# Patient Record
Sex: MALE | Race: WHITE | NOT HISPANIC OR LATINO | Employment: FULL TIME | URBAN - METROPOLITAN AREA
[De-identification: names, ages, dates, MRNs, and addresses within clinical notes are randomized per-mention and may not be internally consistent; named-entity substitution may affect disease eponyms.]

---

## 2018-07-30 ENCOUNTER — TELEPHONE (OUTPATIENT)
Dept: UROLOGY | Facility: AMBULATORY SURGERY CENTER | Age: 48
End: 2018-07-30

## 2018-07-30 NOTE — TELEPHONE ENCOUNTER
Reason for appointment/Complaint/Diagnosis : FREQUENT URINATION     Insurance: [employer]    History of Cancer? no                       If yes, what kind? Previous urologist?     patient said he saw Dr Ramesh Blanton in the past           Records requested/where? No     Outside testing/where? no    Location Preference for office visit?  [address]

## 2019-04-04 RX ORDER — ACYCLOVIR 50 MG/G
CREAM TOPICAL 3 TIMES DAILY
COMMUNITY
Start: 2016-06-09 | End: 2019-04-09 | Stop reason: SDUPTHER

## 2019-04-04 RX ORDER — VALSARTAN 160 MG/1
160 TABLET ORAL 2 TIMES DAILY
COMMUNITY
Start: 2018-10-31

## 2019-04-04 RX ORDER — METOPROLOL SUCCINATE 50 MG/1
TABLET, EXTENDED RELEASE ORAL EVERY 12 HOURS
COMMUNITY
Start: 2017-06-12 | End: 2019-10-29

## 2019-04-04 RX ORDER — MECLIZINE HYDROCHLORIDE 25 MG/1
12.5 TABLET ORAL 3 TIMES DAILY PRN
COMMUNITY
Start: 2019-03-21 | End: 2019-10-29

## 2019-04-04 RX ORDER — AMLODIPINE BESYLATE 5 MG/1
5 TABLET ORAL
COMMUNITY
Start: 2016-05-25

## 2019-04-04 RX ORDER — ATORVASTATIN CALCIUM 80 MG/1
80 TABLET, FILM COATED ORAL
COMMUNITY
Start: 2016-06-09 | End: 2019-10-29

## 2019-04-04 RX ORDER — METOPROLOL TARTRATE 50 MG/1
50 TABLET, FILM COATED ORAL 2 TIMES DAILY
COMMUNITY
Start: 2018-10-09

## 2019-04-04 RX ORDER — ASPIRIN 81 MG/1
81 TABLET, CHEWABLE ORAL
COMMUNITY
Start: 2015-12-29

## 2019-04-04 RX ORDER — VALSARTAN 320 MG/1
TABLET ORAL
COMMUNITY
Start: 2016-02-08 | End: 2019-10-29

## 2019-04-09 ENCOUNTER — OFFICE VISIT (OUTPATIENT)
Dept: FAMILY MEDICINE CLINIC | Facility: CLINIC | Age: 49
End: 2019-04-09
Payer: COMMERCIAL

## 2019-04-09 VITALS
OXYGEN SATURATION: 97 % | SYSTOLIC BLOOD PRESSURE: 132 MMHG | DIASTOLIC BLOOD PRESSURE: 90 MMHG | HEIGHT: 73 IN | WEIGHT: 247.4 LBS | RESPIRATION RATE: 16 BRPM | TEMPERATURE: 97.8 F | HEART RATE: 72 BPM | BODY MASS INDEX: 32.79 KG/M2

## 2019-04-09 DIAGNOSIS — B00.1 COLD SORE: ICD-10-CM

## 2019-04-09 DIAGNOSIS — I10 ESSENTIAL HYPERTENSION: ICD-10-CM

## 2019-04-09 DIAGNOSIS — R93.89 ABNORMAL FINDING ON CT SCAN: Primary | ICD-10-CM

## 2019-04-09 DIAGNOSIS — E11.69 TYPE 2 DIABETES MELLITUS WITH OTHER SPECIFIED COMPLICATION, WITHOUT LONG-TERM CURRENT USE OF INSULIN (HCC): ICD-10-CM

## 2019-04-09 PROBLEM — I49.3 VENTRICULAR ECTOPY: Status: ACTIVE | Noted: 2017-10-18

## 2019-04-09 PROBLEM — E11.9 DIABETES MELLITUS (HCC): Status: ACTIVE | Noted: 2019-04-09

## 2019-04-09 PROBLEM — R07.9 CHEST PAIN: Status: ACTIVE | Noted: 2017-06-08

## 2019-04-09 PROCEDURE — 99214 OFFICE O/P EST MOD 30 MIN: CPT | Performed by: FAMILY MEDICINE

## 2019-04-09 PROCEDURE — 3008F BODY MASS INDEX DOCD: CPT | Performed by: FAMILY MEDICINE

## 2019-04-09 RX ORDER — SODIUM FLUORIDE 5 MG/ML
PASTE, DENTIFRICE DENTAL
COMMUNITY
Start: 2019-03-06

## 2019-04-09 RX ORDER — DOXYCYCLINE HYCLATE 20 MG
20 TABLET ORAL 2 TIMES DAILY
Refills: 3 | COMMUNITY
Start: 2019-02-13

## 2019-04-09 RX ORDER — ACYCLOVIR 50 MG/G
CREAM TOPICAL 3 TIMES DAILY
Qty: 15 G | Refills: 1 | Status: SHIPPED | OUTPATIENT
Start: 2019-04-09

## 2019-04-09 RX ORDER — CHLORHEXIDINE GLUCONATE 0.12 MG/ML
RINSE ORAL
COMMUNITY
Start: 2019-02-15

## 2019-04-09 RX ORDER — BLOOD SUGAR DIAGNOSTIC
STRIP MISCELLANEOUS
COMMUNITY
Start: 2019-01-30

## 2019-04-10 ENCOUNTER — TELEPHONE (OUTPATIENT)
Dept: FAMILY MEDICINE CLINIC | Facility: CLINIC | Age: 49
End: 2019-04-10

## 2019-05-13 ENCOUNTER — TELEPHONE (OUTPATIENT)
Dept: FAMILY MEDICINE CLINIC | Facility: CLINIC | Age: 49
End: 2019-05-13

## 2019-05-13 PROBLEM — R06.00 DYSPNEA: Status: ACTIVE | Noted: 2017-06-08

## 2019-08-18 DIAGNOSIS — E11.69 TYPE 2 DIABETES MELLITUS WITH OTHER SPECIFIED COMPLICATION, WITHOUT LONG-TERM CURRENT USE OF INSULIN (HCC): Primary | ICD-10-CM

## 2019-09-23 ENCOUNTER — TELEPHONE (OUTPATIENT)
Dept: FAMILY MEDICINE CLINIC | Facility: CLINIC | Age: 49
End: 2019-09-23

## 2019-10-29 ENCOUNTER — OFFICE VISIT (OUTPATIENT)
Dept: FAMILY MEDICINE CLINIC | Facility: CLINIC | Age: 49
End: 2019-10-29
Payer: COMMERCIAL

## 2019-10-29 VITALS
TEMPERATURE: 98.7 F | RESPIRATION RATE: 16 BRPM | HEIGHT: 73 IN | OXYGEN SATURATION: 95 % | BODY MASS INDEX: 32.1 KG/M2 | SYSTOLIC BLOOD PRESSURE: 138 MMHG | DIASTOLIC BLOOD PRESSURE: 80 MMHG | HEART RATE: 94 BPM | WEIGHT: 242.2 LBS

## 2019-10-29 DIAGNOSIS — R63.4 RECENT UNEXPLAINED WEIGHT LOSS: ICD-10-CM

## 2019-10-29 DIAGNOSIS — E11.69 TYPE 2 DIABETES MELLITUS WITH OTHER SPECIFIED COMPLICATION, WITHOUT LONG-TERM CURRENT USE OF INSULIN (HCC): Primary | ICD-10-CM

## 2019-10-29 DIAGNOSIS — R25.3 MUSCLE TWITCHING: ICD-10-CM

## 2019-10-29 PROCEDURE — 99214 OFFICE O/P EST MOD 30 MIN: CPT | Performed by: PHYSICIAN ASSISTANT

## 2019-10-29 PROCEDURE — 3008F BODY MASS INDEX DOCD: CPT | Performed by: PHYSICIAN ASSISTANT

## 2019-10-29 RX ORDER — ATORVASTATIN CALCIUM 40 MG/1
20 TABLET, FILM COATED ORAL DAILY
COMMUNITY

## 2019-10-29 NOTE — PROGRESS NOTES
Diabetic Foot Exam    Patient's shoes and socks removed  Right Foot/Ankle   Right Foot Inspection  Skin Exam: skin normal and skin intact no dry skin, no warmth, no callus, no erythema, no maceration, no abnormal color, no pre-ulcer, no ulcer and no callus                          Toe Exam: ROM and strength within normal limits  Sensory       Monofilament testing: intact  Vascular    The right DP pulse is 2+  The right PT pulse is 2+  Left Foot/Ankle  Left Foot Inspection  Skin Exam: skin normal and skin intactno dry skin, no warmth, no erythema, no maceration, normal color, no pre-ulcer, no ulcer and no callus                         Toe Exam: ROM and strength within normal limits                   Sensory       Monofilament: intact  Vascular    The left DP pulse is 2+  The left PT pulse is 2+  Assign Risk Category:  No deformity present; No loss of protective sensation; No weak pulses       Risk: 0    Assessment/Plan:    -continue follow-up with the endocrinologist for diabetes  Current hemoglobin A1c done on August 12th is 7 7  -proceed with a TSH level since there is no level over the past year  -refer to Neurology if twitches persist  -form given for a diabetic eye exam   Patient states that his wife follows with a doctor which is where he will go to  -advised to check his feet daily  He does have a macerated area between his 4th and 5th toes  Follow-up with any open sores   -routine follow-up in 5 months    M*Modal software was used to dictate this note  It may contain errors with dictating incorrect words/spelling  Please contact provider directly for any questions  Diagnoses and all orders for this visit:    Type 2 diabetes mellitus with other specified complication, without long-term current use of insulin (HCC)  -     TSH, 3rd generation with Free T4 reflex    Muscle twitching  -     TSH, 3rd generation with Free T4 reflex  -     Ambulatory referral to Neurology;  Future    Recent unexplained weight loss  -     TSH, 3rd generation with Free T4 reflex    Other orders  -     atorvastatin (LIPITOR) 40 mg tablet; Take 40 mg by mouth daily          Subjective:      Patient ID: Tivis Aschoff  is a 54 y o  male  Patient presents today for evaluation of intermittent twitches she has had all over his body including upper extremities, back, lower extremities over the past 9 days  He states that started when he was traveling on a plane to [facility] and then he went to [place]  He was seen by an urgent care and had a blood test to check his electrolytes which she states were normal except for his elevated blood sugar  He does follow with [facility] and he states that the doctor is in the midst of changing his medications because his most recent hemoglobin A1c done in August 12th was 7 7  He returned back from his trip today  He was concerned about his continued symptoms  He states he does keep himself well hydrated  He states earlier today was having some twitches in his low back but none at this time  He denies any recent illnesses  He denies any fevers or chills  He states he has noticed a 5 lb weight loss  He does have intermittent palpitations but he relates that to his ongoing cardiac problem which she does follow with Cardiology  He denies any fever, chills, nausea, vomiting, abdominal pain, throat pain, congestion, cough  The following portions of the patient's history were reviewed and updated as appropriate:   He  has a past medical history of Varicose veins of left lower extremity    He   Patient Active Problem List    Diagnosis Date Noted    Muscle twitching 10/29/2019    Recent unexplained weight loss 10/29/2019    Diabetes mellitus ([facility] [address] ) 04/09/2019    Abnormal finding on CT scan 04/09/2019    Cold sore 04/09/2019    Ventricular ectopy 10/18/2017    Chest pain 06/08/2017    Dyspnea 06/08/2017    Palpitations 12/28/2016    Lightheadedness 06/10/2016    Diabetes type 2, uncontrolled ([facility] [address] ) 03/03/2016    Neuropathy 03/03/2016    CAD (coronary artery disease) 01/20/2016    Lipid disorder 01/14/2016    Tobacco use disorder 06/27/2014    Near syncope 03/27/2008    Other chronic nonalcoholic liver disease 95/87/7592     He  has a past surgical history that includes Appendectomy  His family history includes Coronary artery disease in his father; Hypertension in his father and mother  He  reports that he quit smoking about 4 years ago  His smoking use included cigarettes  He has never used smokeless tobacco  His alcohol and drug histories are not on file  Current Outpatient Medications   Medication Sig Dispense Refill    atorvastatin (LIPITOR) 40 mg tablet Take 40 mg by mouth daily      ACCU-CHEK GUIDE test strip       acyclovir (ZOVIRAX) 5 % cream Apply topically 3 (three) times a day 15 g 1    amLODIPine (NORVASC) 5 mg tablet Take 5 mg by mouth      aspirin 81 mg chewable tablet Chew 81 mg      chlorhexidine (PERIDEX) 0 12 % solution       doxycycline (PERIOSTAT) 20 MG tablet Take 20 mg by mouth 2 (two) times a day  3    metoprolol tartrate (LOPRESSOR) 50 mg tablet Take 50 mg by mouth      PREVIDENT 5000 PLUS 1 1 % CREA       sitaGLIPtin-metFORMIN (JANUMET)  MG per tablet Take 1 tablet by mouth      ticagrelor 60 MG Take 60 mg by mouth      valsartan (DIOVAN) 160 mg tablet Take 160 mg by mouth       No current facility-administered medications for this visit        Current Outpatient Medications on File Prior to Visit   Medication Sig    atorvastatin (LIPITOR) 40 mg tablet Take 40 mg by mouth daily    ACCU-CHEK GUIDE test strip     acyclovir (ZOVIRAX) 5 % cream Apply topically 3 (three) times a day    amLODIPine (NORVASC) 5 mg tablet Take 5 mg by mouth    aspirin 81 mg chewable tablet Chew 81 mg    chlorhexidine (PERIDEX) 0 12 % solution     doxycycline (PERIOSTAT) 20 MG tablet Take 20 mg by mouth 2 (two) times a day    metoprolol tartrate (LOPRESSOR) 50 mg tablet Take 50 mg by mouth    PREVIDENT 5000 PLUS 1 1 % CREA     sitaGLIPtin-metFORMIN (JANUMET)  MG per tablet Take 1 tablet by mouth    ticagrelor 60 MG Take 60 mg by mouth    valsartan (DIOVAN) 160 mg tablet Take 160 mg by mouth    [DISCONTINUED] atorvastatin (LIPITOR) 80 mg tablet Take 80 mg by mouth    [DISCONTINUED] Doxycycline Hyclate 120 MG TBEC Take by mouth    [DISCONTINUED] meclizine (ANTIVERT) 25 mg tablet Take 12 5 mg by mouth Three times daily as needed    [DISCONTINUED] metoprolol succinate (TOPROL-XL) 50 mg 24 hr tablet Every 12 hours    [DISCONTINUED] ticagrelor (BRILINTA) 90 MG Every 12 hours    [DISCONTINUED] valsartan (DIOVAN) 320 MG tablet TAKE 1 TABLET BY MOUTH EVERY DAY     No current facility-administered medications on file prior to visit  He is allergic to clopidogrel; levofloxacin; lisinopril; mirtazapine; penicillins; sertraline; and sulfa antibiotics       Review of Systems   Constitutional:        As stated in HPI   Respiratory: Negative for shortness of breath  Cardiovascular: Positive for palpitations  Negative for chest pain and leg swelling  Gastrointestinal:        As stated in HPI   Musculoskeletal:        As stated in HPI         Objective:      /80 (BP Location: Left arm, Patient Position: Sitting, Cuff Size: Large)   Pulse 94   Temp 98 7 °F (37 1 °C) (Tympanic)   Resp 16   Ht 6' 1" (1 854 m)   Wt 110 kg (242 lb 3 2 oz)   SpO2 95%   BMI 31 95 kg/m²          Physical Exam   Constitutional: He appears well-developed and well-nourished  No distress  HENT:   Head: Normocephalic and atraumatic  Right Ear: External ear normal    Left Ear: External ear normal    Mouth/Throat: Oropharynx is clear and moist  No oropharyngeal exudate  Neck: Neck supple  No thyromegaly present  Cardiovascular: Normal rate, regular rhythm and normal heart sounds  Pulses are no weak pulses  No murmur heard    Pulses: Dorsalis pedis pulses are 2+ on the right side, and 2+ on the left side  Posterior tibial pulses are 2+ on the right side, and 2+ on the left side  Pulmonary/Chest: Effort normal and breath sounds normal  No respiratory distress  He has no wheezes  He has no rales  Musculoskeletal: He exhibits no edema  Feet:    Lumbar spine:  No tenderness, full range of motion, negative straight leg raise bilaterally  No obvious atrophy of the lower extremities  He currently has shorts on  Feet:   Right Foot:   Skin Integrity: Negative for ulcer, skin breakdown, erythema, warmth, callus or dry skin  Left Foot:   Skin Integrity: Negative for ulcer, skin breakdown, erythema, warmth, callus or dry skin  Lymphadenopathy:     He has no cervical adenopathy  Neurological: He is alert  Skin: Skin is warm  Psychiatric: He has a normal mood and affect

## 2019-10-30 ENCOUNTER — TELEPHONE (OUTPATIENT)
Dept: FAMILY MEDICINE CLINIC | Facility: CLINIC | Age: 49
End: 2019-10-30

## 2019-10-30 NOTE — TELEPHONE ENCOUNTER
Patient saw you yesterday and was referred to neurology    He cannot get in until January so is asking for any other options other than the ER

## 2019-12-03 ENCOUNTER — OFFICE VISIT (OUTPATIENT)
Dept: FAMILY MEDICINE CLINIC | Facility: CLINIC | Age: 49
End: 2019-12-03
Payer: COMMERCIAL

## 2019-12-03 VITALS
RESPIRATION RATE: 16 BRPM | OXYGEN SATURATION: 98 % | DIASTOLIC BLOOD PRESSURE: 78 MMHG | HEIGHT: 73 IN | BODY MASS INDEX: 32.07 KG/M2 | HEART RATE: 69 BPM | SYSTOLIC BLOOD PRESSURE: 116 MMHG | WEIGHT: 242 LBS | TEMPERATURE: 97.1 F

## 2019-12-03 DIAGNOSIS — R55 SYNCOPE, UNSPECIFIED SYNCOPE TYPE: ICD-10-CM

## 2019-12-03 DIAGNOSIS — E11.65 UNCONTROLLED TYPE 2 DIABETES MELLITUS WITH HYPERGLYCEMIA (HCC): Primary | ICD-10-CM

## 2019-12-03 PROCEDURE — 1036F TOBACCO NON-USER: CPT | Performed by: FAMILY MEDICINE

## 2019-12-03 PROCEDURE — 99214 OFFICE O/P EST MOD 30 MIN: CPT | Performed by: FAMILY MEDICINE

## 2019-12-03 PROCEDURE — 3008F BODY MASS INDEX DOCD: CPT | Performed by: FAMILY MEDICINE

## 2019-12-03 RX ORDER — PEN NEEDLE, DIABETIC 32GX 5/32"
NEEDLE, DISPOSABLE MISCELLANEOUS DAILY
Refills: 0 | COMMUNITY
Start: 2019-11-09

## 2019-12-03 NOTE — PROGRESS NOTES
Assessment/Plan:    No problem-specific Assessment & Plan notes found for this encounter  Diagnoses and all orders for this visit:    Uncontrolled type 2 diabetes mellitus with hyperglycemia ([facility] [address] )  Comments:  Continue on insulin glargine as planned  Monitor blood glucose levels  Contact office or report to ER with increasing symptoms or uncontrolled glucose levels  Syncope, unspecified syncope type  Comments:    I am going to check carotid Doppler  If symptoms recur I will refer to neurologist   Was released to go back to work on 12/05/2019 with  no restrictions  Orders:  -     VAS carotid complete study; Future    Other orders  -     insulin glargine (BASAGLAR KWIKPEN) 100 units/mL injection pen; Inject 20 Units under the skin  -     BD PEN NEEDLE ROSALBA U/F 32G X 4 MM MISC; daily          Subjective:      Patient ID: Goldie Lombard  is a 54 y o  male  54 y o  male with PMHX of DM Type 2, HTN, and coronary artery disease seen today for follow-up of an Emergency room visit yesterday and clearance for return to work  Amparo Hodgson He reports that yesterday while at work and sitting at a desk, he felt very light-headed and had to sit down on the ground to avoid passing out  He denies syncope, headache, palpitations or chest pain prior to the onset of symptoms  He has a prior history of a heart attack that he says felt similar, although this episode did not have chest pain  Cardiac workup and CT of head in the emergency room were negative for any acute source  He says that recent changes in his Victoza dosage have caused increased HR  He has had difficulty finding a dose to control hyperglycemia while avoiding side effects  He tried switching his dose to 1 2mg on Saturday night and resulting resting HR was 114  At this time he felt palpitations and a headache, and ongoing heat and cold intolerances  He stopped the Victoza Sunday night to avoid these symptoms   In the emergency room yesterday, his blood glucose was 226  He started insulin glargine (Basaglar) last night as planned at previous visits  His blood sugar this morning was 199  He denies any visual changes, abdominal pain or recent changes in urination    HTN being controlled on amlodipine and valsartan  The following portions of the patient's history were reviewed and updated as appropriate: allergies, current medications, past family history, past medical history, past social history, past surgical history and problem list       Review of Systems   Constitutional: Negative for chills, fatigue and fever  HENT: Negative for congestion, sinus pressure, sinus pain and sore throat  Eyes: Negative for visual disturbance  Respiratory: Negative for cough, chest tightness and wheezing  Cardiovascular: Positive for palpitations (While taking Victoza  HR of 114)  Negative for chest pain and leg swelling  Gastrointestinal: Negative for abdominal pain and blood in stool  Endocrine: Positive for cold intolerance and heat intolerance  Negative for polydipsia  Genitourinary: Negative for flank pain and hematuria  Musculoskeletal: Negative for arthralgias, myalgias and neck stiffness  Skin: Negative for color change, rash and wound  Allergic/Immunologic: Negative  Neurological: Positive for dizziness, light-headedness and headaches (While taking Victoza)  Negative for syncope, speech difficulty, weakness and numbness  Hematological: Negative  Psychiatric/Behavioral: Negative for dysphoric mood  The patient is not nervous/anxious and is not hyperactive  Objective:      /78 (BP Location: Left arm, Patient Position: Sitting, Cuff Size: Large)   Pulse 69   Temp (!) 97 1 °F (36 2 °C) (Tympanic)   Resp 16   Ht 6' 1" (1 854 m)   Wt 110 kg (242 lb)   SpO2 98%   BMI 31 93 kg/m²          Physical Exam   Constitutional: He is oriented to person, place, and time  He appears well-developed and well-nourished  No distress     HENT: Head: Normocephalic and atraumatic  Right Ear: External ear normal    Left Ear: External ear normal    Nose: Nose normal    Mouth/Throat: Oropharynx is clear and moist  No oropharyngeal exudate  Eyes: Pupils are equal, round, and reactive to light  Neck: Normal range of motion  Neck supple  Cardiovascular: Normal rate, regular rhythm, normal heart sounds and intact distal pulses  Pulmonary/Chest: Effort normal and breath sounds normal  No stridor  No respiratory distress  Abdominal: Soft  Bowel sounds are normal  He exhibits no distension  There is no tenderness  Musculoskeletal: Normal range of motion  Neurological: He is alert and oriented to person, place, and time  Skin: Skin is warm and dry  He is not diaphoretic  Psychiatric: He has a normal mood and affect  His behavior is normal  Judgment and thought content normal    Nursing note and vitals reviewed

## 2019-12-03 NOTE — LETTER
December 3, 2019     Patient: Clarissa Chirinos  YOB: 1970   Date of Visit: 12/3/2019       To Whom it May Concern:    Alisvinicius Madison is under my professional care  He was seen in my office on 12/3/2019  He is fit to go back to work with no exception on 12/05/19  If you have any questions or concerns, please don't hesitate to call           Sincerely,          Steffany Varela MD        CC: No Recipients

## 2020-03-17 LAB — EXT SARS-COV-2: NOT DETECTED

## 2020-03-24 ENCOUNTER — TELEMEDICINE (OUTPATIENT)
Dept: FAMILY MEDICINE CLINIC | Facility: CLINIC | Age: 50
End: 2020-03-24
Payer: COMMERCIAL

## 2020-03-24 VITALS — SYSTOLIC BLOOD PRESSURE: 136 MMHG | DIASTOLIC BLOOD PRESSURE: 86 MMHG | WEIGHT: 235 LBS | BODY MASS INDEX: 31 KG/M2

## 2020-03-24 DIAGNOSIS — R71.8 ELEVATED FERRITIN, HEMOGLOBIN, AND RED BLOOD CELL COUNT (HCC): ICD-10-CM

## 2020-03-24 DIAGNOSIS — R79.89 ELEVATED FERRITIN, HEMOGLOBIN, AND RED BLOOD CELL COUNT (HCC): ICD-10-CM

## 2020-03-24 DIAGNOSIS — R06.02 SHORTNESS OF BREATH: ICD-10-CM

## 2020-03-24 DIAGNOSIS — I10 BENIGN ESSENTIAL HYPERTENSION: ICD-10-CM

## 2020-03-24 DIAGNOSIS — D58.2 ELEVATED FERRITIN, HEMOGLOBIN, AND RED BLOOD CELL COUNT (HCC): ICD-10-CM

## 2020-03-24 DIAGNOSIS — E11.65 UNCONTROLLED TYPE 2 DIABETES MELLITUS WITH HYPERGLYCEMIA (HCC): Primary | ICD-10-CM

## 2020-03-24 PROCEDURE — 99214 OFFICE O/P EST MOD 30 MIN: CPT | Performed by: FAMILY MEDICINE

## 2020-03-24 RX ORDER — GLIPIZIDE 5 MG/1
5 TABLET ORAL
COMMUNITY

## 2020-03-24 NOTE — ASSESSMENT & PLAN NOTE
Lab Results   Component Value Date    HGBA1C 7 7 (H) 08/12/2019    Not well controlled but improving  Continue to follow up with endocrinologist   Will continue to monitor

## 2020-03-24 NOTE — ASSESSMENT & PLAN NOTE
Improving  Will continue to monitor  His oxygen saturation is normal   Awaiting the result of his corona culture  If symptoms worse call or go back to the emergency room

## 2020-03-24 NOTE — PROGRESS NOTES
Virtual Regular Visit    Problem List Items Addressed This Visit        Endocrine    Diabetes type 2, uncontrolled ([facility] [address] ) - Primary       Lab Results   Component Value Date    HGBA1C 7 7 (H) 08/12/2019    Not well controlled but improving  Continue to follow up with endocrinologist   Will continue to monitor  Cardiovascular and Mediastinum    Benign essential hypertension     Fair controlled  Continue same  Will continue to monitor  Continue follow-up with cardiologist             Other    Shortness of breath     Improving  Will continue to monitor  His oxygen saturation is normal   Awaiting the result of his corona culture  If symptoms worse call or go back to the emergency room  Other Visit Diagnoses     BMI 31 0-31 9,adult                   Reason for visit is follow-up post ER visit for shortness of breath  Encounter provider Td Dia MD    Provider located at [address] [place] [address]  [address] [place] [address],[address]   [facility] [id number] [place] [id number]      Recent Visits  No visits were found meeting these conditions  Showing recent visits within past 7 days and meeting all other requirements     Future Appointments  No visits were found meeting these conditions  Showing future appointments within next 150 days and meeting all other requirements        After connecting through [employer], the patient was identified by name and date of birth  Romana Kurtz  was informed that this is a telemedicine visit and that the visit is being conducted through [address] [place] [address] which may not be secure and therefore, might not be HIPAA-compliant  My office door was closed  No one else was in the room  He acknowledged consent and understanding of privacy and security of the video platform  The patient has agreed to participate and understands they can discontinue the visit at any time      Subjective  Romana Kurtz  is a 54 y o  male was complained of shortness of breath that has been improving  He was seen emergency room and was ruled out for acute MI  He had blood work done showed elevated hemoglobin  His blood sugar was elevated and his endocrinologist has been adjusting his medications  He is now on Basaglar 26 units and glipizide 5 mg twice a day he stated his blood sugar has been improving  He has been checking his pulse ox at home and it has been between 93-98 in room air  He is still waiting for the culture for corona virus  His wife culture came back negative  He is staying home and isolated  Past Medical History:   Diagnosis Date    Varicose veins of left lower extremity        Past Surgical History:   Procedure Laterality Date    APPENDECTOMY         Current Outpatient Medications   Medication Sig Dispense Refill    ACCU-CHEK GUIDE test strip       acyclovir (ZOVIRAX) 5 % cream Apply topically 3 (three) times a day 15 g 1    amLODIPine (NORVASC) 5 mg tablet Take 5 mg by mouth      aspirin 81 mg chewable tablet Chew 81 mg      atorvastatin (LIPITOR) 40 mg tablet Take 40 mg by mouth daily      BD PEN NEEDLE ROSALBA U/F 32G X 4 MM MISC daily  0    chlorhexidine (PERIDEX) 0 12 % solution       doxycycline (PERIOSTAT) 20 MG tablet Take 20 mg by mouth 2 (two) times a day  3    insulin glargine (BASAGLAR KWIKPEN) 100 units/mL injection pen Inject 20 Units under the skin      metoprolol tartrate (LOPRESSOR) 50 mg tablet Take 50 mg by mouth      PREVIDENT 5000 PLUS 1 1 % CREA       sitaGLIPtin-metFORMIN (JANUMET)  MG per tablet Take 1 tablet by mouth      ticagrelor 60 MG Take 60 mg by mouth      valsartan (DIOVAN) 160 mg tablet Take 160 mg by mouth       No current facility-administered medications for this visit           Allergies   Allergen Reactions    Clopidogrel Shortness Of Breath     Other reaction(s): Abdominal Discomfort  dizziness, SOB, constant pressure in center of chest, feels like he has to cough, feels several PVC's daily, forceful heart beat-like a drum, easily aggitated, trunk bruising, states all of this started after starting Plavix      Levofloxacin      Other reaction(s): GI upset    Lisinopril      Other reaction(s): Other (See Comments)  COUGH    Mirtazapine      Other reaction(s): Other (See Comments)  Burning skin sensation     Penicillins      Other reaction(s): Unknown Reaction    Sertraline      Other reaction(s): Other (See Comments)  GEN BURNING SENSATION      Sulfa Antibiotics      Other reaction(s): Other (See Comments)  BLOODY STOOLS         Review of Systems   Constitutional: Negative for chills and fever  HENT: Negative for trouble swallowing  Eyes: Negative for visual disturbance  Respiratory: Positive for shortness of breath  Negative for cough  Cardiovascular: Negative for chest pain and palpitations  Gastrointestinal: Negative for abdominal pain, blood in stool and vomiting  Endocrine: Negative for cold intolerance and heat intolerance  Genitourinary: Negative for difficulty urinating and dysuria  Musculoskeletal: Negative for gait problem  Skin: Negative for rash  Neurological: Negative for dizziness, syncope and headaches  Hematological: Negative for adenopathy  Psychiatric/Behavioral: Negative for behavioral problems  Physical Exam   Constitutional: He is oriented to person, place, and time  He appears well-developed and well-nourished  HENT:   Head: Normocephalic and atraumatic  Neurological: He is alert and oriented to person, place, and time  Vitals reviewed  I spent 25 minutes with the patient during this visit  BMI Counseling: Body mass index is 31 kg/m²  The BMI is above normal  Nutrition recommendations include reducing portion sizes, decreasing overall calorie intake and 3-5 servings of fruits/vegetables daily  Exercise recommendations include moderate aerobic physical activity for 150 minutes/week

## 2020-03-25 ENCOUNTER — TELEPHONE (OUTPATIENT)
Dept: SURGICAL ONCOLOGY | Facility: CLINIC | Age: 50
End: 2020-03-25

## 2020-03-25 NOTE — TELEPHONE ENCOUNTER
New Patient Encounter    New Patient Intake Form   Patient Details:  Connor Rodriguez  1970  [id number]    Background Information:  [address] starts by opening a telephone encounter and gathering the following information   Who is calling to schedule? If not self, relationship to patient? self   Referring Provider Dr Vanesa Robbins   What is the diagnosis? Elevated ferritin, hemoglobin, and red blood cell count   Is this diagnosis confirmed? Yes   When was the diagnosis? Was being monitored but has been increasing    Is there a confirmed diagnosis from a biopsy/tissue reviewed by pathology? na   Is patient aware of diagnosis? Yes   Is there a personal history of cancer and what kind? no   Is there a family history of cancer and what kind? no   Reason for visit? New Diagnosis   Have you had any imaging or labs done? If so: when, where? Yes  na   Are records in EPIC? yes   Was the patient told to bring a disk? no   Does the patient smoke or Vape? no   If yes, how many packs or cartridges per day? na   Scheduling Information:   Preferred [place]:  [place]     Requesting Specific Provider? Dr Sonia Mello   Are there any dates/time the patient cannot be seen? na   Miscellaneous: na   After completing the above information, please route to Financial Counselor and the appropriate Nurse Navigator for review   Symptoms

## 2020-04-29 ENCOUNTER — TELEPHONE (OUTPATIENT)
Dept: HEMATOLOGY ONCOLOGY | Facility: CLINIC | Age: 50
End: 2020-04-29

## 2020-05-13 ENCOUNTER — CONSULT (OUTPATIENT)
Dept: HEMATOLOGY ONCOLOGY | Facility: CLINIC | Age: 50
End: 2020-05-13
Payer: COMMERCIAL

## 2020-05-13 VITALS
RESPIRATION RATE: 18 BRPM | HEART RATE: 73 BPM | WEIGHT: 248.6 LBS | SYSTOLIC BLOOD PRESSURE: 132 MMHG | BODY MASS INDEX: 31.9 KG/M2 | HEIGHT: 74 IN | TEMPERATURE: 97.9 F | DIASTOLIC BLOOD PRESSURE: 84 MMHG | OXYGEN SATURATION: 98 %

## 2020-05-13 DIAGNOSIS — R71.8 ELEVATED FERRITIN, HEMOGLOBIN, AND RED BLOOD CELL COUNT (HCC): ICD-10-CM

## 2020-05-13 DIAGNOSIS — R71.8 ELEVATED HEMATOCRIT: Primary | ICD-10-CM

## 2020-05-13 DIAGNOSIS — D58.2 ELEVATED FERRITIN, HEMOGLOBIN, AND RED BLOOD CELL COUNT (HCC): ICD-10-CM

## 2020-05-13 DIAGNOSIS — R79.89 ELEVATED FERRITIN, HEMOGLOBIN, AND RED BLOOD CELL COUNT (HCC): ICD-10-CM

## 2020-05-13 PROCEDURE — 99243 OFF/OP CNSLTJ NEW/EST LOW 30: CPT | Performed by: PHYSICIAN ASSISTANT

## 2020-05-26 ENCOUNTER — TELEPHONE (OUTPATIENT)
Dept: FAMILY MEDICINE CLINIC | Facility: CLINIC | Age: 50
End: 2020-05-26

## 2020-06-01 DIAGNOSIS — Z20.828 EXPOSURE TO SARS VIRUS: Primary | ICD-10-CM

## 2020-06-02 ENCOUNTER — TELEPHONE (OUTPATIENT)
Dept: HEMATOLOGY ONCOLOGY | Facility: MEDICAL CENTER | Age: 50
End: 2020-06-02

## 2020-06-11 ENCOUNTER — TELEPHONE (OUTPATIENT)
Dept: HEMATOLOGY ONCOLOGY | Facility: CLINIC | Age: 50
End: 2020-06-11

## 2020-06-12 ENCOUNTER — OFFICE VISIT (OUTPATIENT)
Dept: HEMATOLOGY ONCOLOGY | Facility: CLINIC | Age: 50
End: 2020-06-12
Payer: COMMERCIAL

## 2020-06-12 VITALS
TEMPERATURE: 95.7 F | WEIGHT: 247.4 LBS | HEART RATE: 69 BPM | BODY MASS INDEX: 31.75 KG/M2 | HEIGHT: 74 IN | DIASTOLIC BLOOD PRESSURE: 72 MMHG | OXYGEN SATURATION: 97 % | RESPIRATION RATE: 18 BRPM | SYSTOLIC BLOOD PRESSURE: 128 MMHG

## 2020-06-12 DIAGNOSIS — R71.8 ELEVATED HEMATOCRIT: Primary | ICD-10-CM

## 2020-06-12 PROBLEM — B00.1 COLD SORE: Status: RESOLVED | Noted: 2019-04-09 | Resolved: 2020-06-12

## 2020-06-12 PROCEDURE — 1036F TOBACCO NON-USER: CPT | Performed by: INTERNAL MEDICINE

## 2020-06-12 PROCEDURE — 3078F DIAST BP <80 MM HG: CPT | Performed by: INTERNAL MEDICINE

## 2020-06-12 PROCEDURE — 3008F BODY MASS INDEX DOCD: CPT | Performed by: INTERNAL MEDICINE

## 2020-06-12 PROCEDURE — 99213 OFFICE O/P EST LOW 20 MIN: CPT | Performed by: INTERNAL MEDICINE

## 2020-06-12 PROCEDURE — 3074F SYST BP LT 130 MM HG: CPT | Performed by: INTERNAL MEDICINE

## 2020-06-24 ENCOUNTER — TELEPHONE (OUTPATIENT)
Dept: FAMILY MEDICINE CLINIC | Facility: CLINIC | Age: 50
End: 2020-06-24

## 2021-02-16 ENCOUNTER — TELEPHONE (OUTPATIENT)
Dept: ADMINISTRATIVE | Facility: OTHER | Age: 51
End: 2021-02-16

## 2021-02-16 NOTE — TELEPHONE ENCOUNTER
Upon review of the In Basket request we have found/obtained the documentation  After careful review of the document we are unable to complete this request for Diabetic Foot Exam because the documentation does not have the proper verbiage (wording) needed to close the requested care gap(s)  ie: Monofilament test, pulses  Any additional questions or concerns should be emailed to the Practice Liaisons via [email or web address] email, please do not reply via In Basket      Thank you  Juan Pablo Matt

## 2021-02-16 NOTE — TELEPHONE ENCOUNTER
----- Message from Nano Sheridan sent at 2/16/2021  9:14 AM EST -----  Regarding: DM foot exam  02/16/21 9:14 AM    Hello, our patient Bessy Cummins  has had Diabetic Foot Exam completed/performed  Please assist in updating the patient chart by pulling the document from Encounters Tab within Chart Review  The date of service is 7/1/20       Thank you,  Nano Sheridan   [address]

## 2021-02-17 ENCOUNTER — TELEPHONE (OUTPATIENT)
Dept: FAMILY MEDICINE CLINIC | Facility: CLINIC | Age: 51
End: 2021-02-17

## 2021-03-11 ENCOUNTER — TELEPHONE (OUTPATIENT)
Dept: FAMILY MEDICINE CLINIC | Facility: CLINIC | Age: 51
End: 2021-03-11

## 2021-03-11 NOTE — TELEPHONE ENCOUNTER
Patient transferred  care to [address] [place] [address] in [place]  Record request scanned 2/17/21

## 2023-06-30 ENCOUNTER — TELEPHONE (OUTPATIENT)
Dept: FAMILY MEDICINE CLINIC | Facility: CLINIC | Age: 53
End: 2023-06-30

## 2023-06-30 NOTE — TELEPHONE ENCOUNTER
Call from [facility], [address],   [facility], [address]. Claim: [id number]  Phone: [phone]    Asking for the order for Covid antibody testing ordered and asked that it be mailed to them.   OK per Justyna Friedman